# Patient Record
Sex: FEMALE | Race: OTHER | HISPANIC OR LATINO | URBAN - METROPOLITAN AREA
[De-identification: names, ages, dates, MRNs, and addresses within clinical notes are randomized per-mention and may not be internally consistent; named-entity substitution may affect disease eponyms.]

---

## 2024-03-15 ENCOUNTER — OUTPATIENT (OUTPATIENT)
Dept: INPATIENT UNIT | Facility: HOSPITAL | Age: 34
LOS: 1 days | End: 2024-03-15
Payer: MEDICAID

## 2024-03-15 DIAGNOSIS — O26.899 OTHER SPECIFIED PREGNANCY RELATED CONDITIONS, UNSPECIFIED TRIMESTER: ICD-10-CM

## 2024-03-15 DIAGNOSIS — O34.219 MATERNAL CARE FOR UNSPECIFIED TYPE SCAR FROM PREVIOUS CESAREAN DELIVERY: ICD-10-CM

## 2024-03-15 DIAGNOSIS — Z98.82 BREAST IMPLANT STATUS: Chronic | ICD-10-CM

## 2024-03-15 DIAGNOSIS — Z3A.30 30 WEEKS GESTATION OF PREGNANCY: ICD-10-CM

## 2024-03-15 PROCEDURE — G0463: CPT

## 2024-03-15 PROCEDURE — 99221 1ST HOSP IP/OBS SF/LOW 40: CPT | Mod: GC

## 2024-03-15 NOTE — OB PROVIDER TRIAGE NOTE - NS_DISCHARGEPRINT_OBGYN_ALL_OB
[place]  OB Triage Discharge Instructions/[facility] [place] [facility] OB Triage Discharge Instructions

## 2024-03-15 NOTE — OB PROVIDER TRIAGE NOTE - HISTORY OF PRESENT ILLNESS
30 yo  approximately 30 week gestation presents to request insurance application. She reports good fetal movement, no contractions, vaginal bleediing or loss of fluid.  Patient reports unconfirmed EGA, she believes her LMP was July.

## 2024-03-15 NOTE — OB PROVIDER TRIAGE NOTE - NSOBPROVIDERNOTE_OBGYN_ALL_OB_FT
32 yo  approx 30 weeks presents ans she would like to apply for health insurance. No s/s pre-term labor. Patient refuses exam or fetal monitoring as she is afraid she will be billed. Extensive instructions regarding ability to back bill for any evaluation, emphasized need to initiate prenatal care ASAP. Address and phone number for [facility] given, confirmed ability to apply for medicaid and initiate care at that office today.  Patient signed AMA 32 yo  approx 30 weeks presents ans she would like to apply for health insurance. No s/s pre-term labor. Patient refuses exam or fetal monitoring as she is afraid she will be billed. Extensive instructions regarding ability to back bill for any evaluation, emphasized need to initiate prenatal care ASAP. Address and phone number for [facility] given, confirmed ability to apply for medicaid and initiate care at that office today.  Patient signed AMA at approx 12 pm

## 2024-03-15 NOTE — OB PROVIDER TRIAGE NOTE - TERM DELIVERIES, OB PROFILE
Gen: WD/WN, NAD, non-toxic  HENT: NCAT  Cardiac: Well perfused  Resp: No resp distress  Skin: Warm, dry, no rashes or lesions  Neuro: Awake, alert & oriented x 3
2

## 2024-03-22 ENCOUNTER — INPATIENT (INPATIENT)
Facility: HOSPITAL | Age: 34
LOS: 2 days | Discharge: HOME | End: 2024-03-25
Attending: OBSTETRICS & GYNECOLOGY | Admitting: OBSTETRICS & GYNECOLOGY
Payer: MEDICAID

## 2024-03-22 VITALS
RESPIRATION RATE: 18 BRPM | SYSTOLIC BLOOD PRESSURE: 114 MMHG | TEMPERATURE: 98 F | DIASTOLIC BLOOD PRESSURE: 71 MMHG | HEART RATE: 72 BPM

## 2024-03-22 DIAGNOSIS — Z98.891 HISTORY OF UTERINE SCAR FROM PREVIOUS SURGERY: Chronic | ICD-10-CM

## 2024-03-22 DIAGNOSIS — Z3A.37 37 WEEKS GESTATION OF PREGNANCY: ICD-10-CM

## 2024-03-22 DIAGNOSIS — O26.899 OTHER SPECIFIED PREGNANCY RELATED CONDITIONS, UNSPECIFIED TRIMESTER: ICD-10-CM

## 2024-03-22 DIAGNOSIS — O36.5990 MATERNAL CARE FOR OTHER KNOWN OR SUSPECTED POOR FETAL GROWTH, UNSPECIFIED TRIMESTER, NOT APPLICABLE OR UNSPECIFIED: ICD-10-CM

## 2024-03-22 DIAGNOSIS — O26.893 OTHER SPECIFIED PREGNANCY RELATED CONDITIONS, THIRD TRIMESTER: ICD-10-CM

## 2024-03-22 DIAGNOSIS — Z98.82 BREAST IMPLANT STATUS: Chronic | ICD-10-CM

## 2024-03-22 DIAGNOSIS — Z90.3 ACQUIRED ABSENCE OF STOMACH [PART OF]: Chronic | ICD-10-CM

## 2024-03-22 LAB
ABO RH CONFIRMATION: SIGNIFICANT CHANGE UP
APPEARANCE UR: ABNORMAL
BACTERIA # UR AUTO: ABNORMAL /HPF
BILIRUB UR-MCNC: NEGATIVE — SIGNIFICANT CHANGE UP
BLD GP AB SCN SERPL QL: SIGNIFICANT CHANGE UP
COLOR SPEC: YELLOW — SIGNIFICANT CHANGE UP
DIFF PNL FLD: NEGATIVE — SIGNIFICANT CHANGE UP
GLUCOSE UR QL: NEGATIVE MG/DL — SIGNIFICANT CHANGE UP
HCT VFR BLD CALC: 34.1 % — LOW (ref 34.5–45)
HGB BLD-MCNC: 11.6 G/DL — SIGNIFICANT CHANGE UP (ref 11.5–15.5)
HIV 1 & 2 AB SERPL IA.RAPID: SIGNIFICANT CHANGE UP
KETONES UR-MCNC: NEGATIVE MG/DL — SIGNIFICANT CHANGE UP
LEUKOCYTE ESTERASE UR-ACNC: NEGATIVE — SIGNIFICANT CHANGE UP
MCHC RBC-ENTMCNC: 30.7 PG — SIGNIFICANT CHANGE UP (ref 27–34)
MCHC RBC-ENTMCNC: 34 GM/DL — SIGNIFICANT CHANGE UP (ref 32–36)
MCV RBC AUTO: 90.2 FL — SIGNIFICANT CHANGE UP (ref 80–100)
NITRITE UR-MCNC: NEGATIVE — SIGNIFICANT CHANGE UP
PH UR: 7.5 — SIGNIFICANT CHANGE UP (ref 5–8)
PLATELET # BLD AUTO: 268 K/UL — SIGNIFICANT CHANGE UP (ref 150–400)
PROT UR-MCNC: SIGNIFICANT CHANGE UP MG/DL
RBC # BLD: 3.78 M/UL — LOW (ref 3.8–5.2)
RBC # FLD: 12.8 % — SIGNIFICANT CHANGE UP (ref 10.3–14.5)
RBC CASTS # UR COMP ASSIST: 1 /HPF — SIGNIFICANT CHANGE UP (ref 0–4)
SP GR SPEC: 1.02 — SIGNIFICANT CHANGE UP (ref 1–1.03)
SQUAMOUS # UR AUTO: 2 /HPF — SIGNIFICANT CHANGE UP (ref 0–5)
UROBILINOGEN FLD QL: 1 MG/DL — SIGNIFICANT CHANGE UP (ref 0.2–1)
WBC # BLD: 7.49 K/UL — SIGNIFICANT CHANGE UP (ref 3.8–10.5)
WBC # FLD AUTO: 7.49 K/UL — SIGNIFICANT CHANGE UP (ref 3.8–10.5)
WBC UR QL: 1 /HPF — SIGNIFICANT CHANGE UP (ref 0–5)

## 2024-03-22 PROCEDURE — 88307 TISSUE EXAM BY PATHOLOGIST: CPT | Mod: 26

## 2024-03-22 PROCEDURE — 12345F: CUSTOM | Mod: NC

## 2024-03-22 RX ORDER — CITRIC ACID/SODIUM CITRATE 300-500 MG
30 SOLUTION, ORAL ORAL ONCE
Refills: 0 | Status: COMPLETED | OUTPATIENT
Start: 2024-03-22 | End: 2024-03-22

## 2024-03-22 RX ORDER — OXYCODONE HYDROCHLORIDE 5 MG/1
5 TABLET ORAL
Refills: 0 | Status: DISCONTINUED | OUTPATIENT
Start: 2024-03-23 | End: 2024-03-25

## 2024-03-22 RX ORDER — DEXAMETHASONE 0.5 MG/5ML
4 ELIXIR ORAL EVERY 6 HOURS
Refills: 0 | Status: DISCONTINUED | OUTPATIENT
Start: 2024-03-22 | End: 2024-03-25

## 2024-03-22 RX ORDER — ACETAMINOPHEN 500 MG
975 TABLET ORAL
Refills: 0 | Status: DISCONTINUED | OUTPATIENT
Start: 2024-03-23 | End: 2024-03-25

## 2024-03-22 RX ORDER — SODIUM CHLORIDE 9 MG/ML
1000 INJECTION, SOLUTION INTRAVENOUS ONCE
Refills: 0 | Status: COMPLETED | OUTPATIENT
Start: 2024-03-22 | End: 2024-03-22

## 2024-03-22 RX ORDER — CEFAZOLIN SODIUM 1 G
2000 VIAL (EA) INJECTION ONCE
Refills: 0 | Status: COMPLETED | OUTPATIENT
Start: 2024-03-22 | End: 2024-03-22

## 2024-03-22 RX ORDER — FAMOTIDINE 10 MG/ML
20 INJECTION INTRAVENOUS ONCE
Refills: 0 | Status: COMPLETED | OUTPATIENT
Start: 2024-03-22 | End: 2024-03-22

## 2024-03-22 RX ORDER — NALOXONE HYDROCHLORIDE 4 MG/.1ML
0.1 SPRAY NASAL
Refills: 0 | Status: DISCONTINUED | OUTPATIENT
Start: 2024-03-22 | End: 2024-03-25

## 2024-03-22 RX ORDER — CHLORHEXIDINE GLUCONATE 213 G/1000ML
1 SOLUTION TOPICAL DAILY
Refills: 0 | Status: DISCONTINUED | OUTPATIENT
Start: 2024-03-22 | End: 2024-03-23

## 2024-03-22 RX ORDER — MAGNESIUM HYDROXIDE 400 MG/1
30 TABLET, CHEWABLE ORAL
Refills: 0 | Status: DISCONTINUED | OUTPATIENT
Start: 2024-03-23 | End: 2024-03-25

## 2024-03-22 RX ORDER — SIMETHICONE 80 MG/1
80 TABLET, CHEWABLE ORAL EVERY 4 HOURS
Refills: 0 | Status: DISCONTINUED | OUTPATIENT
Start: 2024-03-23 | End: 2024-03-25

## 2024-03-22 RX ORDER — SCOPALAMINE 1 MG/3D
1 PATCH, EXTENDED RELEASE TRANSDERMAL ONCE
Refills: 0 | Status: COMPLETED | OUTPATIENT
Start: 2024-03-22 | End: 2024-03-22

## 2024-03-22 RX ORDER — SODIUM CHLORIDE 9 MG/ML
1000 INJECTION, SOLUTION INTRAVENOUS
Refills: 0 | Status: DISCONTINUED | OUTPATIENT
Start: 2024-03-22 | End: 2024-03-23

## 2024-03-22 RX ORDER — TRANEXAMIC ACID 100 MG/ML
1000 INJECTION, SOLUTION INTRAVENOUS ONCE
Refills: 0 | Status: COMPLETED | OUTPATIENT
Start: 2024-03-22 | End: 2024-03-22

## 2024-03-22 RX ORDER — OXYTOCIN 10 UNIT/ML
333.33 VIAL (ML) INJECTION
Qty: 20 | Refills: 0 | Status: DISCONTINUED | OUTPATIENT
Start: 2024-03-23 | End: 2024-03-25

## 2024-03-22 RX ORDER — LANOLIN
1 OINTMENT (GRAM) TOPICAL EVERY 6 HOURS
Refills: 0 | Status: DISCONTINUED | OUTPATIENT
Start: 2024-03-23 | End: 2024-03-25

## 2024-03-22 RX ORDER — DIPHENHYDRAMINE HCL 50 MG
25 CAPSULE ORAL EVERY 6 HOURS
Refills: 0 | Status: DISCONTINUED | OUTPATIENT
Start: 2024-03-23 | End: 2024-03-25

## 2024-03-22 RX ORDER — TETANUS TOXOID, REDUCED DIPHTHERIA TOXOID AND ACELLULAR PERTUSSIS VACCINE, ADSORBED 5; 2.5; 8; 8; 2.5 [IU]/.5ML; [IU]/.5ML; UG/.5ML; UG/.5ML; UG/.5ML
0.5 SUSPENSION INTRAMUSCULAR ONCE
Refills: 0 | Status: DISCONTINUED | OUTPATIENT
Start: 2024-03-23 | End: 2024-03-25

## 2024-03-22 RX ORDER — ENOXAPARIN SODIUM 100 MG/ML
40 INJECTION SUBCUTANEOUS EVERY 24 HOURS
Refills: 0 | Status: DISCONTINUED | OUTPATIENT
Start: 2024-03-23 | End: 2024-03-25

## 2024-03-22 RX ORDER — CEFAZOLIN SODIUM 1 G
2000 VIAL (EA) INJECTION ONCE
Refills: 0 | Status: DISCONTINUED | OUTPATIENT
Start: 2024-03-22 | End: 2024-03-22

## 2024-03-22 RX ORDER — ONDANSETRON 8 MG/1
4 TABLET, FILM COATED ORAL EVERY 6 HOURS
Refills: 0 | Status: DISCONTINUED | OUTPATIENT
Start: 2024-03-22 | End: 2024-03-25

## 2024-03-22 RX ORDER — OXYCODONE HYDROCHLORIDE 5 MG/1
5 TABLET ORAL ONCE
Refills: 0 | Status: DISCONTINUED | OUTPATIENT
Start: 2024-03-23 | End: 2024-03-25

## 2024-03-22 RX ORDER — ACETAMINOPHEN 500 MG
975 TABLET ORAL ONCE
Refills: 0 | Status: COMPLETED | OUTPATIENT
Start: 2024-03-22 | End: 2024-03-22

## 2024-03-22 RX ORDER — KETOROLAC TROMETHAMINE 30 MG/ML
30 SYRINGE (ML) INJECTION EVERY 6 HOURS
Refills: 0 | Status: DISCONTINUED | OUTPATIENT
Start: 2024-03-23 | End: 2024-03-24

## 2024-03-22 RX ORDER — IBUPROFEN 200 MG
600 TABLET ORAL EVERY 6 HOURS
Refills: 0 | Status: COMPLETED | OUTPATIENT
Start: 2024-03-23 | End: 2025-02-19

## 2024-03-22 RX ORDER — SODIUM CHLORIDE 9 MG/ML
1000 INJECTION, SOLUTION INTRAVENOUS
Refills: 0 | Status: DISCONTINUED | OUTPATIENT
Start: 2024-03-23 | End: 2024-03-25

## 2024-03-22 RX ORDER — OXYTOCIN 10 UNIT/ML
333.33 VIAL (ML) INJECTION
Qty: 20 | Refills: 0 | Status: COMPLETED | OUTPATIENT
Start: 2024-03-22 | End: 2024-03-22

## 2024-03-22 RX ADMIN — SODIUM CHLORIDE 2000 MILLILITER(S): 9 INJECTION, SOLUTION INTRAVENOUS at 15:50

## 2024-03-22 RX ADMIN — Medication 30 MILLILITER(S): at 21:27

## 2024-03-22 RX ADMIN — SCOPALAMINE 1 PATCH: 1 PATCH, EXTENDED RELEASE TRANSDERMAL at 18:56

## 2024-03-22 RX ADMIN — Medication 30 MILLIGRAM(S): at 23:05

## 2024-03-22 RX ADMIN — SODIUM CHLORIDE 125 MILLILITER(S): 9 INJECTION, SOLUTION INTRAVENOUS at 18:06

## 2024-03-22 RX ADMIN — SODIUM CHLORIDE 125 MILLILITER(S): 9 INJECTION, SOLUTION INTRAVENOUS at 22:14

## 2024-03-22 RX ADMIN — Medication 2000 MILLIGRAM(S): at 22:20

## 2024-03-22 RX ADMIN — FAMOTIDINE 20 MILLIGRAM(S): 10 INJECTION INTRAVENOUS at 21:26

## 2024-03-22 RX ADMIN — TRANEXAMIC ACID 220 MILLIGRAM(S): 100 INJECTION, SOLUTION INTRAVENOUS at 22:25

## 2024-03-22 RX ADMIN — Medication 975 MILLIGRAM(S): at 18:10

## 2024-03-22 NOTE — OB PROVIDER H&P - ASSESSMENT
Patient is a 33 y.o.  at 37 weeks 6 days gestation by LMP admitted to L&D for repeat  section for severe FGR (AC 2nd%ile, EFW 2nd%ile). Reactive NST.     - consent  - admission labs  - Diet: NPO  - IV hydration  - continuous toco and fetal heart monitoring until surgery  - preop antibiotics 2g ancef  - GBS unknown, swab taken  - f/u prenatal labs    Discussed with Dr. Jordan

## 2024-03-22 NOTE — OB PROVIDER H&P - ATTENDING COMMENTS
Agree with above assessment and plan.  Pt is a  is a 33 y.o.  at 37 weeks 6 days gestation by LMP admitted to L&D for repeat  section for severe FGR (AC 2nd%ile, EFW 2nd%ile).   Given severe FGR at 2 %tile at 37+ wks, would recommend delivery at this time  Pt has h/o prior c/s x 2.    Plan:  Admit to L+D  NPO   continuous EFM and toco monitoring  Anesthesia consult for c/s   Informed consent obtained and all questions answered for the patient.  Pt will accept blood products if needed.    KATELYN Kuhn (OB hospitalist)

## 2024-03-22 NOTE — OB RN DELIVERY SUMMARY - NS_SEPSISRSKCALC_OBGYN_ALL_OB_FT
GBS status in the 'Prenatal Lab tests/results section' on the OB RN Patient Profile must be documented.   EOS calculated successfully. EOS Risk Factor: 0.5/1000 live births ([facility] national incidence); GA=37w3d; Temp=98.24; ROM=0; GBS='Unknown'; Antibiotics='No antibiotics or any antibiotics < 2 hrs prior to birth'

## 2024-03-22 NOTE — OB PROVIDER H&P - HISTORY OF PRESENT ILLNESS
Patient is a 33 y.o.  at 37 weeks 6 days by LMP who presents to L&D for concern for contractions.   Patient reports she recently moved here from [facility]. She had lost her medical insurance before her pregnancy due to her  becoming unemployed; she has not received any prenatal care in this pregnancy.   She reports for the past week she has been having intermittent cramping, however today she noted her abdomen became tight and she was concerned. She reports feeling a gush of fluid, but denies currently leaking. +FM, -VB.   Patient reports a history of antiphospholipid syndrome diagnosed in her first pregnancy and placed on lovenox, however she was told in her second pregnancy she only needed to take aspirin.   She reports last eating an apple and drinking a glass of milk at 1000.     This pregnancy is complicated by:   - no prenatal care  - recent migration to the country  - lack of resources    OBHx:  - pCS@38 weeks (pt reports she had to have a CS due to the antiphospholipid syntdome), male, 2300g, , [facility]  - rCS@37 weeks c/b contractions, male, 3000g, 23, [facility]  GynHx: denies abnormal paps, STDs, ovarian cysts, fibroids  PMH: denies  PSH: CS x 2, gastric sleeve, breast implants  Meds: none  All: NKDA  SocialHx: staying with family and her infant, currently  from ; denies alcohol, tobacco, and drug use.    Patient is a 33 y.o.  at 37 weeks 6 days by LMP who presents to L&D for concern for contractions.   Patient reports she recently moved here from [facility]. She had lost her medical insurance before her pregnancy due to her  becoming unemployed; she has not received any prenatal care in this pregnancy.   She reports for the past week she has been having intermittent cramping, however today she noted her abdomen became tight and she was concerned. She reports feeling a gush of fluid, but denies currently leaking. +FM, -VB.   Patient reports a history of antiphospholipid syndrome diagnosed in her first pregnancy and placed on lovenox, however she was told in her second pregnancy she only needed to take aspirin.   She reports last eating an apple and drinking a glass of milk at 1000.     This pregnancy is complicated by:   - no prenatal care  - recent migration to the country  - lack of resources  - short interval pregnancy    OBHx:  - pCS@38 weeks (pt reports she had to have a CS due to the antiphospholipid syntdome), male, 2300g, , [facility]  - rCS@37 weeks c/b contractions, male, 3000g, 23, [facility]  GynHx: denies abnormal paps, STDs, ovarian cysts, fibroids  PMH: denies  PSH: CS x 2, gastric sleeve, breast implants  Meds: none  All: NKDA  SocialHx: staying with family and her infant, currently  from ; denies alcohol, tobacco, and drug use.

## 2024-03-22 NOTE — OB RN PATIENT PROFILE - NS_SOCIALWORKCONSULTREASON_OBGYN_ALL_OB_FT
food assistance [place]artpeel Counseling:  I discussed with the patient the risks of Wartpeel including but not limited to erythema, scaling, itching, weeping, crusting, and pain.

## 2024-03-22 NOTE — OB PROVIDER DELIVERY SUMMARY - NSSELHIDDEN_OBGYN_ALL_OB_FT
[NS_[email or web address]_ALL_OB_FT:[id number]],[NS_DeliveryAssist1_OBGYN_ALL_OB_FT:[id number]],[NS_DeliveryRN_OBGYN_ALL_OB_FT:[id number]=]

## 2024-03-22 NOTE — OB RN INTRAOPERATIVE NOTE - NSSELHIDDEN_OBGYN_ALL_OB_FT
[NS_DeliveryAttending1_OBGYN_ALL_OB_FT:[id number]],[NS_DeliveryAssist1_OBGYN_ALL_OB_FT:[id number]=],[NS_DeliveryRN_OBGYN_ALL_OB_FT:[id number]=]

## 2024-03-22 NOTE — OB PROVIDER DELIVERY SUMMARY - NSPROVIDERDELIVERYNOTE_OBGYN_ALL_OB_FT
34yo  presenting with limited prenatal care and severe fetal growth restriction. Admitted for rCS.  Patient was taken to the OR, a repeat low transverse  section was performed and a viable male infant was delivered atraumatically in cephalic presentation at 2258. Placenta delivered at 2259. Hysterotomy, peritoneum, fascia, subcutaneous and skin were reapproximated with suture. Excellent hemostasis was obtained at each layer of closure.  Apgars 9/9  EBL 320cc  Dictation#[id number]

## 2024-03-22 NOTE — OB RN PATIENT PROFILE - FUNCTIONAL ASSESSMENT - DAILY ACTIVITY 1.
Continue VEEG monitoring  Sleep deprivation  Seizure precautions  Ativan 2 mg IV PRN seizure > 3-5 minutes 4 = No assist / stand by assistance

## 2024-03-22 NOTE — OB PROVIDER H&P - NSHPPHYSICALEXAM_GEN_ALL_CORE
Vital Signs Last 24 Hrs  T(C): 36.5 (22 Mar 2024 14:43), Max: 36.5 (22 Mar 2024 13:38)  T(F): 97.7 (22 Mar 2024 14:43), Max: 97.7 (22 Mar 2024 13:38)  HR: 72 (22 Mar 2024 14:43) (72 - 72)  BP: 114/71 (22 Mar 2024 14:43) (114/71 - 114/71)  RR: 18 (22 Mar 2024 14:43) (18 - 18)      Parameters below as of 22 Mar 2024 14:43  Patient On (Oxygen Delivery Method): room air    Gen: NAD  Abd: gravid, non-tender  SSE: no pooling, physiologic discharge, no bleeding. Nitrazine negative, negative ferning. Swabs taken  SVE: 0/0/-3  FHT: 130 bpm, moderate variability, + accels, - decels   [place]: irregular

## 2024-03-22 NOTE — OB PROVIDER DELIVERY SUMMARY - NSCSDELIVASCHE_OBGYN_ALL_OB
Patient no showed for appointment today, and left message to contact our office tor reschedule.   Unscheduled

## 2024-03-22 NOTE — OB RN PATIENT PROFILE - FALL HARM RISK - UNIVERSAL INTERVENTIONS
Bed in lowest position, wheels locked, appropriate side rails in place/Call bell, personal items and telephone in reach/Instruct patient to call for assistance before getting out of bed or chair/Non-slip footwear when patient is out of bed/[place] to call system/Physically safe environment - no spills, clutter or unnecessary equipment/Purposeful Proactive Rounding/Room/bathroom lighting operational, light cord in reach

## 2024-03-22 NOTE — OB RN DELIVERY SUMMARY - NS_GENERALBABYACOMMENTA_OBGYN_ALL_OB_FT
Skin to skin started in recovery due to mothers request. Patient did not have support person at bedside.

## 2024-03-22 NOTE — OB RN DELIVERY SUMMARY - NSSELHIDDEN_OBGYN_ALL_OB_FT
[NS_DeliveryAttending1_OBGYN_ALL_OB_FT:[id number]],[NS_DeliveryAssist1_OBGYN_ALL_OB_FT:[id number]=],[NS_DeliveryRN_OBGYN_ALL_OB_FT:[id number]=]

## 2024-03-23 LAB
HBV SURFACE AG SERPL QL IA: SIGNIFICANT CHANGE UP
HCV RNA SPEC NAA+PROBE-LOG IU: SIGNIFICANT CHANGE UP
HCV RNA SPEC NAA+PROBE-LOG IU: SIGNIFICANT CHANGE UP LOGIU/ML
HIV 1+2 AB+HIV1 P24 AG SERPL QL IA: SIGNIFICANT CHANGE UP
RUBV IGG SER-ACNC: 7.3 INDEX — SIGNIFICANT CHANGE UP
RUBV IGG SER-IMP: POSITIVE — SIGNIFICANT CHANGE UP
T PALLIDUM AB TITR SER: NEGATIVE — SIGNIFICANT CHANGE UP
VZV IGG FLD QL IA: 772.9 INDEX — SIGNIFICANT CHANGE UP
VZV IGG FLD QL IA: POSITIVE — SIGNIFICANT CHANGE UP

## 2024-03-23 RX ADMIN — SCOPALAMINE 1 PATCH: 1 PATCH, EXTENDED RELEASE TRANSDERMAL at 00:14

## 2024-03-23 RX ADMIN — Medication 1000 MILLIUNIT(S)/MIN: at 00:12

## 2024-03-23 RX ADMIN — Medication 975 MILLIGRAM(S): at 09:17

## 2024-03-23 RX ADMIN — ENOXAPARIN SODIUM 40 MILLIGRAM(S): 100 INJECTION SUBCUTANEOUS at 12:50

## 2024-03-23 RX ADMIN — SIMETHICONE 80 MILLIGRAM(S): 80 TABLET, CHEWABLE ORAL at 09:17

## 2024-03-23 RX ADMIN — Medication 30 MILLIGRAM(S): at 12:51

## 2024-03-23 RX ADMIN — Medication 975 MILLIGRAM(S): at 15:13

## 2024-03-23 RX ADMIN — Medication 975 MILLIGRAM(S): at 00:15

## 2024-03-23 RX ADMIN — Medication 30 MILLIGRAM(S): at 17:41

## 2024-03-23 RX ADMIN — Medication 975 MILLIGRAM(S): at 21:17

## 2024-03-23 NOTE — DISCHARGE NOTE OB - PATIENT PORTAL LINK FT
You can access the FollowMyHealth Patient Portal offered by [facility] by registering at the following website: [email or web address]. By joining [facility]’s FollowMyHealth portal, you will also be able to view your health information using other applications (apps) compatible with our system.

## 2024-03-23 NOTE — DISCHARGE NOTE OB - CARE PROVIDER_API CALL
Michele Bazan  Obstetrics and Gynecology  [facility] [place] [address]  Phone: [phone]  Fax: [phone]  Follow Up Time:

## 2024-03-23 NOTE — DISCHARGE NOTE OB - MATERIALS PROVIDED
Vaccinations/[facility]  Screening Program/  Immunization Record/Breastfeeding Log/Breastfeeding Mother’s Support Group Information/Guide to Postpartum Care/[facility] Hearing Screen Program/Back To Sleep Handout/Shaken Baby Prevention Handout/Birth Certificate Instructions/Discharge Medication Information for Patients and Families Pocket Guide

## 2024-03-23 NOTE — DISCHARGE NOTE OB - HOSPITAL COURSE
Patient underwent a repeat  delivery for severe FGR. Post-op course was uncomplicated. Pain is well controlled with PRN medication. She has no difficulty with ambulation, voiding, or PO intake. Lab values and vital signs are within normal limits prior to discharge.

## 2024-03-24 DIAGNOSIS — D62 ACUTE POSTHEMORRHAGIC ANEMIA: ICD-10-CM

## 2024-03-24 LAB
BASOPHILS # BLD AUTO: 0.02 K/UL — SIGNIFICANT CHANGE UP (ref 0–0.2)
BASOPHILS NFR BLD AUTO: 0.3 % — SIGNIFICANT CHANGE UP (ref 0–2)
EOSINOPHIL # BLD AUTO: 0.09 K/UL — SIGNIFICANT CHANGE UP (ref 0–0.5)
EOSINOPHIL NFR BLD AUTO: 1.5 % — SIGNIFICANT CHANGE UP (ref 0–6)
HCT VFR BLD CALC: 25.2 % — LOW (ref 34.5–45)
HGB BLD-MCNC: 8.5 G/DL — LOW (ref 11.5–15.5)
IMM GRANULOCYTES NFR BLD AUTO: 0.3 % — SIGNIFICANT CHANGE UP (ref 0–0.9)
LYMPHOCYTES # BLD AUTO: 1.43 K/UL — SIGNIFICANT CHANGE UP (ref 1–3.3)
LYMPHOCYTES # BLD AUTO: 23.7 % — SIGNIFICANT CHANGE UP (ref 13–44)
MCHC RBC-ENTMCNC: 31.1 PG — SIGNIFICANT CHANGE UP (ref 27–34)
MCHC RBC-ENTMCNC: 33.7 GM/DL — SIGNIFICANT CHANGE UP (ref 32–36)
MCV RBC AUTO: 92.3 FL — SIGNIFICANT CHANGE UP (ref 80–100)
MEV IGG SER-ACNC: 65.1 AU/ML — SIGNIFICANT CHANGE UP
MEV IGG+IGM SER-IMP: POSITIVE — SIGNIFICANT CHANGE UP
MONOCYTES # BLD AUTO: 0.47 K/UL — SIGNIFICANT CHANGE UP (ref 0–0.9)
MONOCYTES NFR BLD AUTO: 7.8 % — SIGNIFICANT CHANGE UP (ref 2–14)
NEUTROPHILS # BLD AUTO: 4 K/UL — SIGNIFICANT CHANGE UP (ref 1.8–7.4)
NEUTROPHILS NFR BLD AUTO: 66.4 % — SIGNIFICANT CHANGE UP (ref 43–77)
PLATELET # BLD AUTO: 203 K/UL — SIGNIFICANT CHANGE UP (ref 150–400)
RBC # BLD: 2.73 M/UL — LOW (ref 3.8–5.2)
RBC # FLD: 12.9 % — SIGNIFICANT CHANGE UP (ref 10.3–14.5)
WBC # BLD: 6.03 K/UL — SIGNIFICANT CHANGE UP (ref 3.8–10.5)
WBC # FLD AUTO: 6.03 K/UL — SIGNIFICANT CHANGE UP (ref 3.8–10.5)

## 2024-03-24 RX ORDER — ACETAMINOPHEN 500 MG
3 TABLET ORAL
Qty: 60 | Refills: 0
Start: 2024-03-24 | End: 2024-03-28

## 2024-03-24 RX ORDER — OXYCODONE HYDROCHLORIDE 5 MG/1
1 TABLET ORAL
Qty: 8 | Refills: 0
Start: 2024-03-24

## 2024-03-24 RX ORDER — IBUPROFEN 200 MG
1 TABLET ORAL
Qty: 20 | Refills: 0
Start: 2024-03-24 | End: 2024-03-28

## 2024-03-24 RX ORDER — ASCORBIC ACID 60 MG
500 TABLET,CHEWABLE ORAL DAILY
Refills: 0 | Status: DISCONTINUED | OUTPATIENT
Start: 2024-03-24 | End: 2024-03-25

## 2024-03-24 RX ORDER — FERROUS SULFATE 325(65) MG
325 TABLET ORAL DAILY
Refills: 0 | Status: DISCONTINUED | OUTPATIENT
Start: 2024-03-24 | End: 2024-03-25

## 2024-03-24 RX ADMIN — ENOXAPARIN SODIUM 40 MILLIGRAM(S): 100 INJECTION SUBCUTANEOUS at 10:23

## 2024-03-24 RX ADMIN — Medication 975 MILLIGRAM(S): at 15:15

## 2024-03-24 RX ADMIN — Medication 500 MILLIGRAM(S): at 15:15

## 2024-03-24 RX ADMIN — Medication 975 MILLIGRAM(S): at 20:15

## 2024-03-24 RX ADMIN — Medication 325 MILLIGRAM(S): at 15:15

## 2024-03-24 RX ADMIN — Medication 975 MILLIGRAM(S): at 08:32

## 2024-03-24 RX ADMIN — Medication 30 MILLIGRAM(S): at 00:52

## 2024-03-24 RX ADMIN — Medication 975 MILLIGRAM(S): at 03:28

## 2024-03-25 VITALS
HEART RATE: 77 BPM | SYSTOLIC BLOOD PRESSURE: 109 MMHG | OXYGEN SATURATION: 96 % | TEMPERATURE: 98 F | DIASTOLIC BLOOD PRESSURE: 72 MMHG | RESPIRATION RATE: 16 BRPM

## 2024-03-25 LAB
C TRACH RRNA SPEC QL NAA+PROBE: DETECTED
CULTURE RESULTS: SIGNIFICANT CHANGE UP
N GONORRHOEA RRNA SPEC QL NAA+PROBE: SIGNIFICANT CHANGE UP
SPECIMEN SOURCE: SIGNIFICANT CHANGE UP

## 2024-03-25 RX ORDER — AZITHROMYCIN 500 MG/1
2 TABLET, FILM COATED ORAL
Qty: 4 | Refills: 0
Start: 2024-03-25

## 2024-03-25 RX ORDER — AZITHROMYCIN 500 MG/1
2 TABLET, FILM COATED ORAL
Qty: 2 | Refills: 0
Start: 2024-03-25

## 2024-03-25 RX ORDER — IBUPROFEN 200 MG
600 TABLET ORAL EVERY 6 HOURS
Refills: 0 | Status: DISCONTINUED | OUTPATIENT
Start: 2024-03-25 | End: 2024-03-25

## 2024-03-25 RX ADMIN — Medication 325 MILLIGRAM(S): at 13:08

## 2024-03-25 RX ADMIN — Medication 500 MILLIGRAM(S): at 13:08

## 2024-03-25 RX ADMIN — Medication 600 MILLIGRAM(S): at 09:05

## 2024-03-25 RX ADMIN — Medication 975 MILLIGRAM(S): at 15:02

## 2024-03-25 RX ADMIN — ENOXAPARIN SODIUM 40 MILLIGRAM(S): 100 INJECTION SUBCUTANEOUS at 10:47

## 2024-03-25 RX ADMIN — Medication 600 MILLIGRAM(S): at 15:03

## 2024-03-25 RX ADMIN — Medication 975 MILLIGRAM(S): at 09:04

## 2024-03-25 RX ADMIN — Medication 975 MILLIGRAM(S): at 02:17

## 2024-03-25 NOTE — PROGRESS NOTE ADULT - ATTENDING COMMENTS
33y  now POD#1 s/p repeat  section at 37w3d gestation, c/b no prenatal care and h/o antiphospholipid syndrome  - currently with minimal bleeding/lochia only, no symptoms of anemia, post partum Hgb pending for tomorrow AM, preop Hgb 11.6  - healthy male infant at bedside, desires circumcision  - vital signs, lab results, and physical exam reassuring   - DVT PPX: ambulation/Lovenox  - anticipated discharge: continue inpatient care
KHALIDA ESPARZA is a 33y  now POD#2 s/p repeat  section at 37w3d gestation, c/b no prenatal care and h/o antiphospholipid syndrome    Plan:  Patient feels well  Hgb:   11.6>8.5. Postoperative anemia related to acute blood loss. Asymptomatic, will start PO iron  Continue the current pain medication  Encourage  Ambulation  Encourage regular diet  DVT ppx: SCDs only when not ambulating + Lovenox  Male infant, uncertain regarding circumcision.   Dispo: Patient would like to stay additional day to optimize pain control. Likely d/c 3/25      #possible APS  - Case reviewed with MFM. Unclear SAB history. Per patient, she was informally tested by employer and during last pregnancy.   - Per MFM, no recommendations for postpartum lovenox
patient seen with aid of   patient doing well  VS and labs reviewed  incision c/d/i with steri strips  POD#3 s/p repeat csection  encourage ambulation  male infant - circumcision completed  antiphospholipid syndome - continue lovenox for 6 weeks  consider dc home today  requesting social work

## 2024-03-25 NOTE — PROGRESS NOTE ADULT - ASSESSMENT
A/P:  33y  now POD#3 s/p repeat  section at 37w3d gestation, c/b no prenatal care and h/o antiphospholipid syndrome  -Vital Signs Stable  -Voiding, tolerating PO, bowel function nml   -Heme: Hgb 11.6 > 8.5   -Advance care as tolerated   -Continue routine postpartum/postoperative care and education  -Healthy male infant, desires circumcision.  - h/o antiphospholipid syndorme, for lovenox 6wks pp  -Dispo: Pt is stable for discharge today pending attending approval   
INTERVAL HPI/OVERNIGHT EVENTS:  33y Female s/p c section under spinal anesthesia with duramorph for post op analgesia on 3/22/24    Vital Signs Last 24 Hrs  T(C): 36.8 (23 Mar 2024 05:41), Max: 36.8 (22 Mar 2024 16:08)  T(F): 98.2 (23 Mar 2024 05:41), Max: 98.24 (22 Mar 2024 16:08)  HR: 71 (23 Mar 2024 05:41) (53 - 79)  BP: 106/70 (23 Mar 2024 05:41) (94/53 - 145/77)  BP(mean): --  RR: 18 (23 Mar 2024 05:41) (17 - 18)  SpO2: 98% (23 Mar 2024 05:41) (92% - 100%)    Parameters below as of 22 Mar 2024 16:24  Patient On (Oxygen Delivery Method): room air            Patient's overall anesthesia satisfaction: Positive    Patients pain is well controlled with IT duramorph    No respiratory events overnight    No pruritis at this time    Patient doing well     No headache      No residual numbness or weakness, sensory and motor function intact.    No anesthetic complications or complaints noted or reported          .            
A/P:  33y  now POD#1 s/p repeat  section at 37w3d gestation, c/b no prenatal care and h/o antiphospholipid syndrome  -Vital Signs Stable  -Pending TOV, tolerating PO, bowel function nml   -Heme: Hgb 11.6 -> AM labs pending   -Advance care as tolerated   -Continue routine postpartum/postoperative care and education  -Healthy male infant, desires circumcision.  - h/o antiphospholipid syndorme, for lovenox 6wks pp  -Dispo: Continue inpatient management  
A/P:  33y  now POD#2 s/p repeat  section at 37w3d gestation, c/b no prenatal care and h/o antiphospholipid syndrome  -Vital Signs Stable  -Voiding, tolerating PO, bowel function nml   -Heme: Hgb 11.6 -> AM labs pending   -Advance care as tolerated   -Continue routine postpartum/postoperative care and education  -Healthy male infant, desires circumcision.  - h/o antiphospholipid syndorme, for lovenox 6wks pp  -Dispo: Pt is stable for discharge today pending attending approval

## 2024-03-25 NOTE — PROGRESS NOTE ADULT - SUBJECTIVE AND OBJECTIVE BOX
KHALIDA ESPARZA is a 33y  now POD#2 s/p repeat  section at 37w3d gestation, c/b no prenatal care and h/o antiphospholipid syndrome    S:    The patient has no complaints.   Pain is controlled with current treatment regimen.   She is tolerating PO without n/v, ambulating without assistance  + flatus/-BM/+ voiding  She endorses appropriate lochia, which is decreasing  Patient denies lightheadedness, dizziness, palpitations, shortness of breath and chest pain.     O:    Vital Signs Last 24 Hrs  T(C): 36.5 (24 Mar 2024 04:31), Max: 36.8 (23 Mar 2024 05:41)  T(F): 97.7 (24 Mar 2024 04:31), Max: 98.3 (23 Mar 2024 07:52)  HR: 58 (24 Mar 2024 04:31) (55 - 71)  BP: 102/66 (24 Mar 2024 04:31) (88/52 - 106/70)  RR: 18 (24 Mar 2024 04:31) (18 - 18)  SpO2: 99% (24 Mar 2024 04:31) (96% - 99%)    Parameters below as of 24 Mar 2024 04:31  Patient On (Oxygen Delivery Method): room air        Gen: NAD, AOx3  Pulm: Breathing comfortably on RA  Abdomen:  Soft, non-tender, non-distended  Uterus:  Fundus firm below umbilicus  Incision:  Clean/dry/intact with steri-strips in place  VE:  +Lochia  Ext:  Non-tender, non-edematous       LABS                        11.6   7.49  )-----------( 268      ( 22 Mar 2024 15:49 )             34.1                 
KHALIDA ESPARZA is a 33y  now POD#3 s/p repeat  section at 37w3d gestation, c/b no prenatal care and h/o antiphospholipid syndrome    S:    The patient has no complaints.   Pain is controlled with current treatment regimen.   She is tolerating PO without n/v, ambulating without assistance  + flatus/-BM/+ voiding  She endorses appropriate lochia, which is decreasing  Patient denies lightheadedness, dizziness, palpitations, shortness of breath and chest pain.     O:    Vital Signs  T(C): 36.7 (25 Mar 2024 04:01), Max: 36.7 (25 Mar 2024 04:01)  T(F): 98.1 (25 Mar 2024 04:01), Max: 98.1 (25 Mar 2024 04:01)  HR: 61 (25 Mar 2024 04:01) (58 - 74)  BP: 99/67 (25 Mar 2024 04:01) (98/62 - 123/74)  RR: 18 (25 Mar 2024 04:01) (18 - 18)  SpO2: 96% (25 Mar 2024 04:01) (96% - 99%)    Gen: NAD, AOx3  Pulm: Breathing comfortably on RA  Abdomen:  Soft, non-tender, non-distended  Uterus:  Fundus firm below umbilicus  Incision:  Clean/dry/intact with steri-strips in place  VE:  +Lochia  Ext:  Non-tender, non-edematous       LABS                        11.6   7.49  )-----------( 268      ( 22 Mar 2024 15:49 )             34.1                                 8.5    6.03  )-----------( 203      ( 24 Mar 2024 07:43 )             25.2         
KHALIDA ESPARZA is a 33y  now POD#1 s/p repeat  section at 37w3d gestation, c/b no prenatal care and h/o antiphospholipid syndrome    S:    The patient has no complaints.   Pain is controlled with current treatment regimen.   She is tolerating PO without n/v  - flatus/-BM, pendint TOV this AM  She endorses appropriate lochia  Patient denies lightheadedness, dizziness, palpitations, shortness of breath and chest pain.     O:    T(C): 36.8 (24 @ 05:41), Max: 36.8 (24 @ 16:08)  HR: 71 (24 @ 05:41) (53 - 79)  BP: 106/70 (24 @ 05:41) (94/53 - 145/77)  RR: 18 (24 @ 05:41) (17 - 18)  SpO2: 98% (24 @ 05:41) (92% - 100%)  Wt(kg): --    Gen: NAD, AOx3  Pulm: Breathing comfortably on RA  Abdomen:  Soft, non-tender, non-distended  Uterus:  Fundus firm below umbilicus  Incision:  Clean/dry/intact with steri-strips in place  VE:  +Lochia  Ext:  Non-tender, non-edematous     LABS                        11.6   7.49  )-----------( 268      ( 22 Mar 2024 15:49 )             34.1

## 2024-03-25 NOTE — CHART NOTE - NSCHARTNOTEFT_GEN_A_CORE
Spoke to patient over the phone about chlamydia infection.  Discussed treatment of oral 1g azithromycin x1  Discussed partner treatment  Antibiotic sent to pharmacy.

## 2024-04-01 ENCOUNTER — OUTPATIENT (OUTPATIENT)
Dept: INPATIENT UNIT | Facility: HOSPITAL | Age: 34
LOS: 1 days | End: 2024-04-01
Payer: MEDICAID

## 2024-04-01 VITALS
DIASTOLIC BLOOD PRESSURE: 61 MMHG | TEMPERATURE: 98 F | HEART RATE: 68 BPM | SYSTOLIC BLOOD PRESSURE: 102 MMHG | RESPIRATION RATE: 16 BRPM

## 2024-04-01 VITALS — SYSTOLIC BLOOD PRESSURE: 105 MMHG | DIASTOLIC BLOOD PRESSURE: 61 MMHG | HEART RATE: 68 BPM

## 2024-04-01 DIAGNOSIS — O26.899 OTHER SPECIFIED PREGNANCY RELATED CONDITIONS, UNSPECIFIED TRIMESTER: ICD-10-CM

## 2024-04-01 DIAGNOSIS — Z98.891 HISTORY OF UTERINE SCAR FROM PREVIOUS SURGERY: Chronic | ICD-10-CM

## 2024-04-01 DIAGNOSIS — Z98.82 BREAST IMPLANT STATUS: Chronic | ICD-10-CM

## 2024-04-01 DIAGNOSIS — Z90.3 ACQUIRED ABSENCE OF STOMACH [PART OF]: Chronic | ICD-10-CM

## 2024-04-01 PROBLEM — Z78.9 OTHER SPECIFIED HEALTH STATUS: Chronic | Status: ACTIVE | Noted: 2024-03-22

## 2024-04-01 PROCEDURE — 99212 OFFICE O/P EST SF 10 MIN: CPT | Mod: GC

## 2024-04-01 PROCEDURE — G0463: CPT

## 2024-04-01 NOTE — OB POSTPARTUM TRIAGE NOTE - ATTENDING COMMENTS
a 33y  POD#11 uncomplicated c/s for IUGR at 37w6d who presents to L&D for incisional tightening and raising. Noticed this morning the corner of her incision was raised. Not painful or red and denies discharge from the incision. Denies fever, chills, n/v. Pt notes she was referred to [facility] for postpartum care, did not receive prenatal care here, but she has been unable to schedule an appointment yet due to limited availability.  T(C): 36.9 (24 @ 17:09), Max: 36.9 (24 @ 17:09)  HR: 68 (24 @ 17:09) (68 - 68)  BP: 102/61 (24 @ 17:09) (102/61 - 105/61)  Incision - C/D/I no erythema or drainage  32 y/o POD#11 s/p c/s for IUGR with incision concern   - incision healing well with no evidence of infection  - pt has not been able to scheduled outpatient f/u with [facility]  - information give for Dr Jonathan Pichardo MD

## 2024-04-01 NOTE — OB POSTPARTUM TRIAGE NOTE - ADDITIONAL INSTRUCTIONS
pt to call [facility] for a follow up appointment. pt seen by dr lucero, pt given information on who to call for follow up appointment pt to call [facility] for a follow up appointment. pt seen by dr lucero, pt given information on who to call for follow up appointment    Patient d/c'd home at 1850

## 2024-04-01 NOTE — OB POSTPARTUM TRIAGE NOTE - NSHPPHYSICALEXAM_GEN_ALL_CORE
Vitals:  T(C): 36.9 (04-01-24 @ 17:09), Max: 36.9 (04-01-24 @ 17:09)  HR: 68 (04-01-24 @ 17:09) (68 - 68)  BP: 102/61 (04-01-24 @ 17:09) (102/61 - 105/61)  RR: 16 (04-01-24 @ 17:09) (16 - 16)    Gen: NAD, well-appearing  Lungs: Respirations unlabored  Abd: soft, gravid  Incision: clean, dry, and intact, no pain to palpation or discharge expressed, no erythema, no swelling Vitals:  T(C): 36.9 (04-01-24 @ 17:09), Max: 36.9 (04-01-24 @ 17:09)  HR: 68 (04-01-24 @ 17:09) (68 - 68)  BP: 102/61 (04-01-24 @ 17:09) (102/61 - 105/61)  RR: 16 (04-01-24 @ 17:09) (16 - 16)    Gen: NAD, well-appearing  Lungs: Respirations unlabored  Abd: soft, nontender, nondistended  Incision: clean, dry, and intact, no pain to palpation or discharge expressed, no erythema, induration, fluctuance. Right lateral corner very slightly raised, no concern for incision.

## 2024-04-01 NOTE — OB POSTPARTUM TRIAGE NOTE - HISTORY OF PRESENT ILLNESS
KHALIDA ESPARZA is a 33y  POD#11 uncomplicated c/s for IUGR at 37w6d who presents to L&D for incisional tightening and raising. Noticed this morning the corner of her incision was raised. Not painful or red and no discharge.     POB:  - pCS@38 weeks (pt reports she had to have a CS due to the antiphospholipid syntdome), [facility] 2300g[facility]  G2 - rCS@37 weeks c/b contractions, male, 3000g, 23[facility]  G3 - c/s @ 37w 6d due to IUGR  PGYN: -fibroids/-cysts, denied STD hx, denies abnormal PAPs  PMH: denies  PSH: C/s x 3, gastric sleeve, breast implants  SH: Denies tobacco use, EtOH use and illicit drug use during the pregnancy; Feels safe at home  Meds: none  All: NKDA     KHALIDA ESPARZA is a 33y  POD#11 uncomplicated c/s for IUGR at 37w6d who presents to L&D for incisional tightening and raising. Noticed this morning the corner of her incision was raised. Not painful or red and denies discharge from the incision. Denies fever, chills, n/v. Pt notes she was referred to [facility] for postpartum care, did not receive prenatal care here, but she has been unable to schedule an appointement yet due to limited availability.     POB:  - pCS@38 weeks (pt reports she had to have a CS due to the antiphospholipid syndrome), [facility]  G2 - rCS@37 weeks c/b contractions, [facility], 3000g, [facility]  G3 - c/s @ 37w 6d due to IUGR  PGYN: -fibroids/-cysts, denied STD hx, denies abnormal PAPs  PMH: antiphospholipid syndrome  PSH: C/s x 3, gastric sleeve, breast implants  SH: Denies tobacco use, EtOH use and illicit drug use during the pregnancy; Feels safe at home  Meds: none  All: NKDA

## 2024-04-01 NOTE — OB POSTPARTUM TRIAGE NOTE - NSOBPROVIDERNOTE_OBGYN_ALL_OB_FT
KHALIDA ESPARZA is a 33y  POD#11 uncomplicated c/s for IUGR presenting to L&D for incisional tightening and raised corner. VSS. Incision clean, dry, and intact. No swelling erythema or tenderness.       Plan:  - D/c home    Dispo: Home.    Discussed with Dr. Pichardo KHALIDA ESPARZA is a 33y  POD#11 s/p uncomplicated c/s for IUGR presenting to L&D for concerns regarding her incision.   -  VSS.   - Incision clean, dry, and intact. No swelling erythema or tenderness. No signs or suspicion for infection at this time. Incision healing well.   - Pt encouraged to call [facility] for appointment and if unable to get appointment for postpartum visit, given referral for Dr. Castillo, card given with information.   - Pt is stable for discharge home at this time with outpatient follow up     Discussed with Dr. Pichardo

## 2024-04-01 NOTE — OB POSTPARTUM TRIAGE NOTE - NS_OBGYNHISTORY_OBGYN_ALL_OB_FT
- pCS@38 weeks (pt reports she had to have a CS due to the antiphospholipid syndrome), [facility]  - rCS@37 weeks c/b contractions, male, 3000g[facility]

## 2024-04-05 DIAGNOSIS — Z48.01 ENCOUNTER FOR CHANGE OR REMOVAL OF SURGICAL WOUND DRESSING: ICD-10-CM

## 2024-08-22 PROCEDURE — 86901 BLOOD TYPING SEROLOGIC RH(D): CPT

## 2024-08-22 PROCEDURE — 86762 RUBELLA ANTIBODY: CPT

## 2024-08-22 PROCEDURE — 86703 HIV-1/HIV-2 1 RESULT ANTBDY: CPT

## 2024-08-22 PROCEDURE — 86900 BLOOD TYPING SEROLOGIC ABO: CPT

## 2024-08-22 PROCEDURE — 87491 CHLMYD TRACH DNA AMP PROBE: CPT

## 2024-08-22 PROCEDURE — 87340 HEPATITIS B SURFACE AG IA: CPT

## 2024-08-22 PROCEDURE — 87522 HEPATITIS C REVRS TRNSCRPJ: CPT

## 2024-08-22 PROCEDURE — 87070 CULTURE OTHR SPECIMN AEROBIC: CPT

## 2024-08-22 PROCEDURE — 85027 COMPLETE CBC AUTOMATED: CPT

## 2024-08-22 PROCEDURE — 86787 VARICELLA-ZOSTER ANTIBODY: CPT

## 2024-08-22 PROCEDURE — 81001 URINALYSIS AUTO W/SCOPE: CPT

## 2024-08-22 PROCEDURE — 36415 COLL VENOUS BLD VENIPUNCTURE: CPT

## 2024-08-22 PROCEDURE — 88307 TISSUE EXAM BY PATHOLOGIST: CPT

## 2024-08-22 PROCEDURE — 87591 N.GONORRHOEAE DNA AMP PROB: CPT

## 2024-08-22 PROCEDURE — 86850 RBC ANTIBODY SCREEN: CPT

## 2024-08-22 PROCEDURE — 87077 CULTURE AEROBIC IDENTIFY: CPT

## 2024-08-22 PROCEDURE — T1013: CPT

## 2024-08-22 PROCEDURE — 86765 RUBEOLA ANTIBODY: CPT

## 2024-08-22 PROCEDURE — 85025 COMPLETE CBC W/AUTO DIFF WBC: CPT

## 2024-08-22 PROCEDURE — 86780 TREPONEMA PALLIDUM: CPT

## 2024-08-22 PROCEDURE — 59050 FETAL MONITOR W/REPORT: CPT

## 2024-08-22 PROCEDURE — 87389 HIV-1 AG W/HIV-1&-2 AB AG IA: CPT

## 2025-02-11 NOTE — OB POSTPARTUM TRIAGE NOTE - HOW OFTEN DO YOU HAVE A DRINK CONTAINING ALCOHOL?
In an effort to ensure that our patients [employer], a Team Member has reviewed your chart and identified an opportunity to provide the best care possible. An attempt was made to discuss or schedule due or overdue Preventive or Chronic Condition care.Care Gaps identified: Wellness Visits.    The Outcome was Contact was not made, left message. We are attempting to schedule a yearly wellness visit. If you have any questions or need help with scheduling, contact our Health Outreach Team at [phone].   Type of Appointment needed: Comprehensive Annual Visit   Never